# Patient Record
Sex: FEMALE | Race: WHITE | NOT HISPANIC OR LATINO | Employment: OTHER | URBAN - METROPOLITAN AREA
[De-identification: names, ages, dates, MRNs, and addresses within clinical notes are randomized per-mention and may not be internally consistent; named-entity substitution may affect disease eponyms.]

---

## 2018-02-10 ENCOUNTER — HOSPITAL ENCOUNTER (OUTPATIENT)
Facility: CLINIC | Age: 78
Setting detail: OBSERVATION
Discharge: HOME | End: 2018-02-12
Attending: EMERGENCY MEDICINE | Admitting: INTERNAL MEDICINE
Payer: COMMERCIAL

## 2018-02-10 DIAGNOSIS — R09.02 HYPOXIA: ICD-10-CM

## 2018-02-10 DIAGNOSIS — R00.0 TACHYCARDIA: ICD-10-CM

## 2018-02-10 DIAGNOSIS — J10.1 INFLUENZA A: ICD-10-CM

## 2018-02-10 LAB
ANION GAP SERPL CALCULATED.3IONS-SCNC: 8 MMOL/L (ref 3–14)
BASOPHILS # BLD AUTO: 0 10E9/L (ref 0–0.2)
BASOPHILS NFR BLD AUTO: 0.5 %
BUN SERPL-MCNC: 13 MG/DL (ref 7–30)
CALCIUM SERPL-MCNC: 9 MG/DL (ref 8.5–10.1)
CHLORIDE SERPL-SCNC: 93 MMOL/L (ref 94–109)
CO2 SERPL-SCNC: 28 MMOL/L (ref 20–32)
CREAT SERPL-MCNC: 0.66 MG/DL (ref 0.52–1.04)
DIFFERENTIAL METHOD BLD: ABNORMAL
EOSINOPHIL # BLD AUTO: 0 10E9/L (ref 0–0.7)
EOSINOPHIL NFR BLD AUTO: 0 %
ERYTHROCYTE [DISTWIDTH] IN BLOOD BY AUTOMATED COUNT: 12.7 % (ref 10–15)
FLUAV+FLUBV AG SPEC QL: NEGATIVE
FLUAV+FLUBV AG SPEC QL: POSITIVE
GFR SERPL CREATININE-BSD FRML MDRD: 87 ML/MIN/1.7M2
GLUCOSE SERPL-MCNC: 109 MG/DL (ref 70–99)
HCT VFR BLD AUTO: 39.2 % (ref 35–47)
HGB BLD-MCNC: 13.4 G/DL (ref 11.7–15.7)
IMM GRANULOCYTES # BLD: 0 10E9/L (ref 0–0.4)
IMM GRANULOCYTES NFR BLD: 0.4 %
LYMPHOCYTES # BLD AUTO: 0.4 10E9/L (ref 0.8–5.3)
LYMPHOCYTES NFR BLD AUTO: 4.2 %
MCH RBC QN AUTO: 31 PG (ref 26.5–33)
MCHC RBC AUTO-ENTMCNC: 34.2 G/DL (ref 31.5–36.5)
MCV RBC AUTO: 91 FL (ref 78–100)
MONOCYTES # BLD AUTO: 1.2 10E9/L (ref 0–1.3)
MONOCYTES NFR BLD AUTO: 13.9 %
NEUTROPHILS # BLD AUTO: 6.8 10E9/L (ref 1.6–8.3)
NEUTROPHILS NFR BLD AUTO: 81 %
NRBC # BLD AUTO: 0 10*3/UL
NRBC BLD AUTO-RTO: 0 /100
PLATELET # BLD AUTO: 189 10E9/L (ref 150–450)
POTASSIUM SERPL-SCNC: 3.6 MMOL/L (ref 3.4–5.3)
RBC # BLD AUTO: 4.32 10E12/L (ref 3.8–5.2)
SODIUM SERPL-SCNC: 129 MMOL/L (ref 133–144)
SPECIMEN SOURCE: ABNORMAL
WBC # BLD AUTO: 8.4 10E9/L (ref 4–11)

## 2018-02-10 PROCEDURE — 99285 EMERGENCY DEPT VISIT HI MDM: CPT | Mod: 25

## 2018-02-10 PROCEDURE — 40000275 ZZH STATISTIC RCP TIME EA 10 MIN

## 2018-02-10 PROCEDURE — 25000128 H RX IP 250 OP 636: Performed by: EMERGENCY MEDICINE

## 2018-02-10 PROCEDURE — 80048 BASIC METABOLIC PNL TOTAL CA: CPT | Performed by: EMERGENCY MEDICINE

## 2018-02-10 PROCEDURE — 94640 AIRWAY INHALATION TREATMENT: CPT

## 2018-02-10 PROCEDURE — 96375 TX/PRO/DX INJ NEW DRUG ADDON: CPT

## 2018-02-10 PROCEDURE — 96361 HYDRATE IV INFUSION ADD-ON: CPT

## 2018-02-10 PROCEDURE — 96374 THER/PROPH/DIAG INJ IV PUSH: CPT

## 2018-02-10 PROCEDURE — 25000125 ZZHC RX 250: Performed by: EMERGENCY MEDICINE

## 2018-02-10 PROCEDURE — 87804 INFLUENZA ASSAY W/OPTIC: CPT | Performed by: EMERGENCY MEDICINE

## 2018-02-10 PROCEDURE — 25000132 ZZH RX MED GY IP 250 OP 250 PS 637: Performed by: EMERGENCY MEDICINE

## 2018-02-10 PROCEDURE — 85025 COMPLETE CBC W/AUTO DIFF WBC: CPT | Performed by: EMERGENCY MEDICINE

## 2018-02-10 PROCEDURE — A9270 NON-COVERED ITEM OR SERVICE: HCPCS | Mod: GY | Performed by: EMERGENCY MEDICINE

## 2018-02-10 RX ORDER — ONDANSETRON 2 MG/ML
4 INJECTION INTRAMUSCULAR; INTRAVENOUS ONCE
Status: COMPLETED | OUTPATIENT
Start: 2018-02-10 | End: 2018-02-10

## 2018-02-10 RX ORDER — IPRATROPIUM BROMIDE AND ALBUTEROL SULFATE 2.5; .5 MG/3ML; MG/3ML
3 SOLUTION RESPIRATORY (INHALATION) ONCE
Status: COMPLETED | OUTPATIENT
Start: 2018-02-10 | End: 2018-02-10

## 2018-02-10 RX ORDER — KETOROLAC TROMETHAMINE 15 MG/ML
15 INJECTION, SOLUTION INTRAMUSCULAR; INTRAVENOUS ONCE
Status: COMPLETED | OUTPATIENT
Start: 2018-02-10 | End: 2018-02-10

## 2018-02-10 RX ORDER — ACETAMINOPHEN 500 MG
1000 TABLET ORAL ONCE
Status: COMPLETED | OUTPATIENT
Start: 2018-02-10 | End: 2018-02-10

## 2018-02-10 RX ORDER — OSELTAMIVIR PHOSPHATE 75 MG/1
75 CAPSULE ORAL ONCE
Status: COMPLETED | OUTPATIENT
Start: 2018-02-10 | End: 2018-02-10

## 2018-02-10 RX ADMIN — ACETAMINOPHEN 1000 MG: 500 TABLET, FILM COATED ORAL at 21:10

## 2018-02-10 RX ADMIN — OSELTAMIVIR PHOSPHATE 75 MG: 75 CAPSULE ORAL at 22:14

## 2018-02-10 RX ADMIN — SODIUM CHLORIDE 1000 ML: 9 INJECTION, SOLUTION INTRAVENOUS at 23:17

## 2018-02-10 RX ADMIN — IPRATROPIUM BROMIDE AND ALBUTEROL SULFATE 3 ML: .5; 3 SOLUTION RESPIRATORY (INHALATION) at 20:51

## 2018-02-10 RX ADMIN — ONDANSETRON 4 MG: 2 INJECTION INTRAMUSCULAR; INTRAVENOUS at 21:46

## 2018-02-10 RX ADMIN — SODIUM CHLORIDE 1000 ML: 9 INJECTION, SOLUTION INTRAVENOUS at 21:46

## 2018-02-10 RX ADMIN — KETOROLAC TROMETHAMINE 15 MG: 15 INJECTION, SOLUTION INTRAMUSCULAR; INTRAVENOUS at 21:46

## 2018-02-10 ASSESSMENT — ENCOUNTER SYMPTOMS
RHINORRHEA: 1
DIARRHEA: 1
DYSURIA: 0
FEVER: 1
CHILLS: 1
COUGH: 1
NAUSEA: 1
MYALGIAS: 1
VOMITING: 1

## 2018-02-10 NOTE — IP AVS SNAPSHOT
[facility] Observation Department    [address]    [facility] [address]    Phone:  [phone]                                       After Visit Summary   2/10/2018    Sharron Field    MRN: [id number]           After Visit Summary Signature Page     I have received my discharge instructions, and my questions have been answered. I have discussed any challenges I see with this plan with the nurse or doctor.    ..........................................................................................................................................  Patient/Patient Representative Signature      ..........................................................................................................................................  Patient Representative Print Name and Relationship to Patient    ..................................................               ................................................  Date                                            Time    ..........................................................................................................................................  Reviewed by Signature/Title    ...................................................              ..............................................  Date                                                            Time

## 2018-02-10 NOTE — IP AVS SNAPSHOT
MRN:[id number]                      After Visit Summary   2/10/2018    Sharron Field    MRN: [id number]           Thank you!     Thank you for choosing [facility] for your care. Our goal is always to provide you with excellent care. Hearing back from our patients is one way we can continue to improve our services. Please take a few minutes to complete the written survey that you may receive in the mail after you visit. If you would like to speak to someone directly about your visit please contact Patient Relations at [phone]. Thank you!          Patient Information     Date Of Birth          1940        Designated Caregiver       Most Recent Value    Caregiver    Will someone help with your care after discharge? yes    Name of designated caregiver Arnulfo-son    Phone number of caregiver [phone]    Caregiver address [place]      About your hospital stay     You were admitted on:  February 11, 2018 You last received care in the:  [facility] Observation Department    You were discharged on:  February 12, 2018        Reason for your hospital stay       Influenza                  Who to Call     For medical emergencies, please call 911.  For non-urgent questions about your medical care, please call your primary care provider or clinic, [phone]          Attending Provider     Provider Specialty    Sofia Preciado MD Emergency Medicine    Richard Walters MD Internal Medicine       Primary Care Provider Office Phone # Fax #    Jay Link [phone]      After Care Instructions     Activity       Your activity upon discharge: activity as tolerated            Diet       Follow this diet upon discharge: Orders Placed This Encounter      Regular Diet Adult                  Follow-up Appointments     Follow-up and recommended labs and tests        Follow up with primary clinic in 1 week.                             Pending  "Results     No orders found from 2018 to 2018.            Statement of Approval     Ordered          18 0859  I have reviewed and agree with all the recommendations and orders detailed in this document.  EFFECTIVE NOW     Approved and electronically signed by:  Jose Marrero MD             Admission Information     Date & Time Department Dept. Phone    2/10/2018 [facility] Observation Department [phone]      Your Vitals Were     Blood Pressure Pulse Temperature Respirations Height Weight    139/65 (BP Location: Right arm) 106 98.4  F (36.9  C) (Oral) 20 1.676 m (5' 6\") 75 kg (165 lb 6.4 oz)    Pulse Oximetry BMI (Body Mass Index)                94% 26.7 kg/m2          MyChart Information     [employer] lets you send messages to your doctor, view your test results, renew your prescriptions, schedule appointments and more. To sign up, go to [email or web address] . Click on \"Log in\" on the left side of the screen, which will take you to the Welcome page. Then click on \"Sign up Now\" on the right side of the page.     You will be asked to enter the access code listed below, as well as some personal information. Please follow the directions to create your username and password.     Your access code is: [email or web address]  Expires: 2018  8:59 AM     Your access code will  in 90 days. If you need help or a new code, please call your [place] clinic or [phone].        Care EveryWhere ID     This is your Care EveryWhere ID. This could be used by other organizations to access your [facility] medical records  [id number]        Equal Access to Services     [facility] DAVE : Hadii eloy Callahan, sera christy, qaelina steven. So [facility] [phone].    ATENCIÓN: Si habla español, tiene a wang disposición servicios gratuitos de asistencia lingüística. Llame al [phone].    We comply with applicable federal civil " rights laws and [place] laws. We do not discriminate on the basis of race, color, national origin, age, disability, sex, sexual orientation, or gender identity.               Review of your medicines      START taking        Dose / Directions    oseltamivir 75 MG capsule   Commonly known as:  TAMIFLU   Indication:  Flu        Dose:  75 mg   Take 1 capsule (75 mg) by mouth 2 times daily for 6 doses   Quantity:  6 capsule   Refills:  0         CONTINUE these medicines which have NOT CHANGED        Dose / Directions    losartan-hydrochlorothiazide 100-25 MG per tablet   Commonly known as:  HYZAAR        Dose:  1 tablet   Take 1 tablet by mouth daily   Refills:  0       VITAMIN D (CHOLECALCIFEROL) PO        Dose:  5000 Units   Take 5,000 Units by mouth daily   Refills:  0            Where to get your medicines      These medications were sent to [facility] [address] [address] [address]     Phone:  [phone]     oseltamivir 75 MG capsule                Protect others around you: Learn how to safely use, store and throw away your medicines at www.disposemymeds.org.        ANTIBIOTIC INSTRUCTION     You've Been Prescribed an Antibiotic - Now What?  Your healthcare team thinks that you or your loved one might have an infection. Some infections can be treated with antibiotics, which are powerful, life-saving drugs. Like all medications, antibiotics have side effects and should only be used when necessary. There are some important things you should know about your antibiotic treatment.      Your healthcare team may run tests before you start taking an antibiotic.    Your team may take samples (e.g., from your blood, urine or other areas) to run tests to look for bacteria. These test can be important to determine if you need an antibiotic at all and, if you do, which antibiotic will work best.      Within a few days, your healthcare team might  change or even stop your antibiotic.    Your team may start you on an antibiotic while they are working to find out what is making you sick.    Your team might change your antibiotic because test results show that a different antibiotic would be better to treat your infection.    In some cases, once your team has more information, they learn that you do not need an antibiotic at all. They may find out that you don't have an infection, or that the antibiotic you're taking won't work against your infection. For example, an infection caused by a virus can't be treated with antibiotics. Staying on an antibiotic when you don't need it is more likely to be harmful than helpful.      You may experience side effects from your antibiotic.    Like all medications, antibiotics have side effects. Some of these can be serious.    Let you healthcare team know if you have any known allergies when you are admitted to the hospital.    One significant side effect of nearly all antibiotics is the risk of severe and sometimes deadly diarrhea caused by Clostridium difficile (C. Difficile). This occurs when a person takes antibiotics because some good germs are destroyed. Antibiotic use allows C. diificile to take over, putting patients at high risk for this serious infection.    As a patient or caregiver, it is important to understand your or your loved one's antibiotic treatment. It is especially important for caregivers to speak up when patients can't speak for themselves. Here are some important questions to ask your healthcare team.    What infection is this antibiotic treating and how do you know I have that infection?    What side effects might occur from this antibiotic?    How long will I need to take this antibiotic?    Is it safe to take this antibiotic with other medications or supplements (e.g., vitamins) that I am taking?     Are there any special directions I need to know about taking this antibiotic? For example, should I  take it with food?    How will I be monitored to know whether my infection is responding to the antibiotic?    What tests may help to make sure the right antibiotic is prescribed for me?      Information provided by:  www.cdc.gov/getsmart  U.S. Department of Health and Human Services  Centers for disease Control and Prevention  National Center for Emerging and Zoonotic Infectious Diseases  Division of Healthcare Quality Promotion             Medication List: This is a list of all your medications and when to take them. Check marks below indicate your daily home schedule. Keep this list as a reference.      Medications           Morning Afternoon Evening Bedtime As Needed    losartan-hydrochlorothiazide 100-25 MG per tablet   Commonly known as:  HYZAAR   Take 1 tablet by mouth daily                                oseltamivir 75 MG capsule   Commonly known as:  TAMIFLU   Take 1 capsule (75 mg) by mouth 2 times daily for 6 doses   Last time this was given:  75 mg on 2/12/2018  8:30 AM                                VITAMIN D (CHOLECALCIFEROL) PO   Take 5,000 Units by mouth daily

## 2018-02-11 PROBLEM — J10.1 INFLUENZA A: Status: ACTIVE | Noted: 2018-02-11

## 2018-02-11 PROCEDURE — 25000128 H RX IP 250 OP 636: Performed by: INTERNAL MEDICINE

## 2018-02-11 PROCEDURE — 96361 HYDRATE IV INFUSION ADD-ON: CPT

## 2018-02-11 PROCEDURE — 99219 ZZC INITIAL OBSERVATION CARE,LEVL II: CPT | Performed by: INTERNAL MEDICINE

## 2018-02-11 PROCEDURE — 96376 TX/PRO/DX INJ SAME DRUG ADON: CPT

## 2018-02-11 PROCEDURE — 99207 ZZC CDG-MDM COMPONENT: MEETS LOW - DOWN CODED: CPT | Performed by: INTERNAL MEDICINE

## 2018-02-11 PROCEDURE — 25000128 H RX IP 250 OP 636: Performed by: EMERGENCY MEDICINE

## 2018-02-11 PROCEDURE — 25000132 ZZH RX MED GY IP 250 OP 250 PS 637

## 2018-02-11 PROCEDURE — 25000132 ZZH RX MED GY IP 250 OP 250 PS 637: Performed by: INTERNAL MEDICINE

## 2018-02-11 PROCEDURE — G0378 HOSPITAL OBSERVATION PER HR: HCPCS

## 2018-02-11 PROCEDURE — 99207 ZZC APP CREDIT; MD BILLING SHARED VISIT: CPT | Performed by: INTERNAL MEDICINE

## 2018-02-11 RX ORDER — LOSARTAN POTASSIUM AND HYDROCHLOROTHIAZIDE 12.5; 5 MG/1; MG/1
2 TABLET ORAL DAILY
Status: DISCONTINUED | OUTPATIENT
Start: 2018-02-11 | End: 2018-02-12 | Stop reason: HOSPADM

## 2018-02-11 RX ORDER — LOSARTAN POTASSIUM AND HYDROCHLOROTHIAZIDE 25; 100 MG/1; MG/1
1 TABLET ORAL DAILY
COMMUNITY
End: 2023-08-09 | Stop reason: ALTCHOICE

## 2018-02-11 RX ORDER — ACETAMINOPHEN 650 MG/1
650 SUPPOSITORY RECTAL EVERY 4 HOURS PRN
Status: DISCONTINUED | OUTPATIENT
Start: 2018-02-11 | End: 2018-02-12 | Stop reason: HOSPADM

## 2018-02-11 RX ORDER — NALOXONE HYDROCHLORIDE 0.4 MG/ML
.1-.4 INJECTION, SOLUTION INTRAMUSCULAR; INTRAVENOUS; SUBCUTANEOUS
Status: DISCONTINUED | OUTPATIENT
Start: 2018-02-11 | End: 2018-02-12 | Stop reason: HOSPADM

## 2018-02-11 RX ORDER — ACETAMINOPHEN 325 MG/1
TABLET ORAL
Status: COMPLETED
Start: 2018-02-11 | End: 2018-02-11

## 2018-02-11 RX ORDER — ACETAMINOPHEN 325 MG/1
650 TABLET ORAL EVERY 4 HOURS PRN
Status: DISCONTINUED | OUTPATIENT
Start: 2018-02-11 | End: 2018-02-12 | Stop reason: HOSPADM

## 2018-02-11 RX ORDER — OSELTAMIVIR PHOSPHATE 75 MG/1
75 CAPSULE ORAL 2 TIMES DAILY
Status: DISCONTINUED | OUTPATIENT
Start: 2018-02-11 | End: 2018-02-12 | Stop reason: HOSPADM

## 2018-02-11 RX ORDER — ONDANSETRON 4 MG/1
4 TABLET, ORALLY DISINTEGRATING ORAL EVERY 6 HOURS PRN
Status: DISCONTINUED | OUTPATIENT
Start: 2018-02-11 | End: 2018-02-12 | Stop reason: HOSPADM

## 2018-02-11 RX ORDER — ONDANSETRON 2 MG/ML
4 INJECTION INTRAMUSCULAR; INTRAVENOUS EVERY 6 HOURS PRN
Status: DISCONTINUED | OUTPATIENT
Start: 2018-02-11 | End: 2018-02-12 | Stop reason: HOSPADM

## 2018-02-11 RX ORDER — SODIUM CHLORIDE AND POTASSIUM CHLORIDE 150; 900 MG/100ML; MG/100ML
INJECTION, SOLUTION INTRAVENOUS CONTINUOUS
Status: DISCONTINUED | OUTPATIENT
Start: 2018-02-11 | End: 2018-02-11

## 2018-02-11 RX ADMIN — SODIUM CHLORIDE 1000 ML: 9 INJECTION, SOLUTION INTRAVENOUS at 00:22

## 2018-02-11 RX ADMIN — OSELTAMIVIR PHOSPHATE 75 MG: 75 CAPSULE ORAL at 08:26

## 2018-02-11 RX ADMIN — ACETAMINOPHEN 650 MG: 325 TABLET, FILM COATED ORAL at 08:26

## 2018-02-11 RX ADMIN — POTASSIUM CHLORIDE: 2 INJECTION, SOLUTION, CONCENTRATE INTRAVENOUS at 11:48

## 2018-02-11 RX ADMIN — ACETAMINOPHEN 650 MG: 325 TABLET, FILM COATED ORAL at 23:54

## 2018-02-11 RX ADMIN — ACETAMINOPHEN 650 MG: 325 TABLET, FILM COATED ORAL at 15:32

## 2018-02-11 RX ADMIN — ACETAMINOPHEN 650 MG: 325 TABLET, FILM COATED ORAL at 01:25

## 2018-02-11 RX ADMIN — POTASSIUM CHLORIDE: 2 INJECTION, SOLUTION, CONCENTRATE INTRAVENOUS at 21:17

## 2018-02-11 RX ADMIN — POTASSIUM CHLORIDE: 2 INJECTION, SOLUTION, CONCENTRATE INTRAVENOUS at 01:27

## 2018-02-11 RX ADMIN — OSELTAMIVIR PHOSPHATE 75 MG: 75 CAPSULE ORAL at 20:41

## 2018-02-11 ASSESSMENT — PAIN DESCRIPTION - DESCRIPTORS
DESCRIPTORS: ACHING
DESCRIPTORS: ACHING

## 2018-02-11 NOTE — H&P
Admitted:     02/10/2018      DATE OF ADMISSION:  02/11/2018       CHIEF COMPLAINT:  Fever, weakness, nausea, emesis, runny nose.      HISTORY OF PRESENT ILLNESS:  This is a 77-year-old white female with a history of hypertension who presents with cough which is dry, runny nose, myalgias, headache as well as a fever.  She also had 1 episode of loose stools and has had 2 episodes of emesis which were nonbloody.  She has been in contact with her daughter who has been sick for the past week with a viral syndrome which got better with DayQuil and Nyquil.  The patient feels quite fatigued and weak and hence she came into the ER for further evaluation.  In the ER over here, the patient was seen by Dr. Sofia Preciado.  She was noted to have positive influenza.  I discussed the case with Dr. Preciado and she is going to be admitted for further evaluation.      ALLERGIES:  SULFA.        MEDICATIONS:  Losartan.      PAST MEDICAL HISTORY:  Hypertension.      PAST SURGICAL HISTORY:  Significant for tonsillectomy and adenoidectomy, partial hysterectomy, dental surgery, nasal surgery.      FAMILY HISTORY:  Significant for leukemia in her sister.      SOCIAL HISTORY:  The patient does not smoke.  She drinks alcohol on occasion.      OUTPATIENT MEDICATIONS:  Losartan and HCTZ.      REVIEW OF SYSTEMS:  As mentioned in the HPI, she denies any chest pain or shortness of breath.  She does endorse some lightheadedness.   She denies any abdominal pain.   All other systems are extensively reviewed and deemed unremarkable and negative.      PHYSICAL EXAMINATION:   VITAL SIGNS:  Temperature is 99.5, pulse is 118.  Blood pressure is 127/56, respiratory rate 18, O2 sat is 95% on room air.   GENERAL:  She is alert, awake, oriented, coherent, nontoxic, in no acute distress.   HEENT:  Pupils equal, round, reactive to light.  Pharynx: There is no exudate noted.   LUNGS:  Clear to auscultation.   HEART:  Tachycardic, S1, S2 normal.  No murmurs or  gallops.   ABDOMEN:  Soft, nontender, nondistended, with good bowel sounds.   EXTREMITIES:  There is no edema.   NEUROLOGIC:  Cranial nerves II-XII are grossly within normal limits.  Motor:  She moves all extremities.      LABORATORY:  Labwork obtained here included a basic metabolic panel which is grossly unremarkable other than a sodium of 129 and a chloride of 93.  A CBC with diff is grossly unremarkable.  Her influenza screen is positive for influenza A.      ASSESSMENT AND PLAN:     1.  Positive for influenza A. We will admit her under observation status.  We will hydrate her with IV fluids.  She has been initiated on Tamiflu, which I will continue.   2.  Essential hypertension.  We will hold off on her losartan and HCTZ for now.      CODE STATUS:  Full code.      She will be admitted under observation status.         NICOLASA BRUSH MD             D: 2018   T: 2018   MT: MD      Name:     MIKE WEIR   MRN:      [id number]        Account:      [id number]   :      1940        Admitted:     02/10/2018                   Document: [id number]

## 2018-02-11 NOTE — ED NOTES
[facility]  ED Nurse Handoff Report    Sharron Field is a 77 year old female   ED Chief complaint: Influenza  . ED Diagnosis:   Final diagnoses:   Influenza A   Hypoxia   Tachycardia     Allergies:   Allergies   Allergen Reactions     Sulfa Drugs        Code Status: Full Code  Activity level - Baseline/Home:  Independent. Activity Level - Current:   Stand with Assist. Lift room needed: No. Bariatric: No   Needed: No   Isolation: No. Infection: Not Applicable.     Vital Signs:   Vitals:    02/10/18 2300 02/10/18 2315 02/10/18 2316 02/10/18 2330   BP: 114/46 115/52  127/56   Pulse:       Resp:       Temp:       TempSrc:       SpO2:  95% 97% 95%   Weight:           Cardiac Rhythm:  ,      Pain level: 0-10 Pain Scale: 5  Patient confused: No. Patient Falls Risk: Yes.   Elimination Status: Has voided   Patient Report - Initial Complaint: Flu symptoms.. Focused Assessment: Respiratory - Respiratory WDL:  WDL except; all Rhythm/Pattern, Respiratory: pattern irregular; shortness of breath reported Lung Fields: All Fields Cough Frequency: frequent Cough Type: good; productive (Pt reports having cough and congestion.)   Abnormal Results:   Labs Ordered and Resulted from Time of ED Arrival Up to the Time of Departure from the ED   CBC WITH PLATELETS DIFFERENTIAL - Abnormal; Notable for the following:        Result Value    Absolute Lymphocytes 0.4 (*)     All other components within normal limits   BASIC METABOLIC PANEL - Abnormal; Notable for the following:     Sodium 129 (*)     Chloride 93 (*)     Glucose 109 (*)     All other components within normal limits   INFLUENZA A/B ANTIGEN - Abnormal; Notable for the following:     Influenza A Positive (*)     All other components within normal limits   PERIPHERAL IV CATHETER   PULSE OXIMETRY NURSING     .   Treatments provided: fluids, meds, neb  Family Comments: daughter at bedside.  OBS brochure/video discussed/provided to patient:  Yes  ED Medications:    Medications   0.9% sodium chloride BOLUS (1,000 mLs Intravenous New Bag 2/11/18 0022)   ipratropium - albuterol 0.5 mg/2.5 mg/3 mL (DUONEB) neb solution 3 mL (3 mLs Nebulization Given 2/10/18 2051)   0.9% sodium chloride BOLUS (0 mLs Intravenous Stopped 2/11/18 0019)   ondansetron (ZOFRAN) injection 4 mg (4 mg Intravenous Given 2/10/18 2146)   ketorolac (TORADOL) injection 15 mg (15 mg Intravenous Given 2/10/18 2146)   acetaminophen (TYLENOL) tablet 1,000 mg (1,000 mg Oral Given 2/10/18 2110)   oseltamivir (TAMIFLU) capsule 75 mg (75 mg Oral Given 2/10/18 2214)   0.9% sodium chloride BOLUS (0 mLs Intravenous Stopped 2/11/18 0019)     Drips infusing:  No  For the majority of the shift, the patient's behavior Green. Interventions performed were n/a.     Severe Sepsis OR Septic Shock Diagnosis Present: No      ED Nurse Name/Phone Number: Ming Ayon,   12:39 AM    RECEIVING UNIT ED HANDOFF REVIEW    Above ED Nurse Handoff Report was reviewed: Yes  Reviewed by: Hailey Madera on February 11, 2018 at 12:53 AM

## 2018-02-11 NOTE — PLAN OF CARE
Problem: Patient Care Overview  Goal: Plan of Care/Patient Progress Review  PRIMARY DIAGNOSIS: Influenza A  OUTPATIENT/OBSERVATION GOALS TO BE MET BEFORE DISCHARGE:  1. Dyspnea improved and O2 sats >88% on RA or back to baseline O2 levels: Yes   SpO2: 96 %, O2 Device: None (Room air)    2. Tolerating oral abx or appropriate plans made outpatient infusion: Yes, on Tamiflu    3. Vitals signs normal or return to baseline: Yes    4. Short term supplemental O2 needed with activity at home: No    5. Tolerate oral intake to maintain hydration: tolerating PO.  On IV fluids to improved hydration.    6. Return to near baseline physical activity: Yes    Discharge Planner Nurse   Safe discharge environment identified: Yes  Barriers to discharge: Yes       Entered by: Kunal Tanner 02/11/2018 3:44 PM        VSS.  Pt given tylenol for shoulder aches 2/10.   Denies SOB, nausea.   IVF infusing.  Ongoing assessment.  Please review provider order for any additional goals.   Nurse to notify provider when observation goals have been met and patient is ready for discharge.

## 2018-02-11 NOTE — PLAN OF CARE
Problem: Patient Care Overview  Goal: Plan of Care/Patient Progress Review  Outcome: Improving  PRIMARY DIAGNOSIS: Influenza A  OUTPATIENT/OBSERVATION GOALS TO BE MET BEFORE DISCHARGE:  1. Dyspnea improved and O2 sats >88% on RA or back to baseline O2 levels: Yes   SpO2: 98 %, O2 Device: None (Room air)    2. Tolerating oral abx or appropriate plans made outpatient infusion: Yes, on oral tamilfu    3. Vitals signs normal or return to baseline: Yes    4. Short term supplemental O2 needed with activity at home: No, will assess while ambulating.     5. Tolerate oral intake to maintain hydration: No, Per pt reporting that she has not been able to drink enough fluid. Will assess how pt tolerates breakfast     Return to near baseline physical activity: Yes, Pt SBA and denies dizziness/SOB/CP while ambulating in room. Will attempt to ambulate in halls and check O2 sats after breakfast.     BP slightly Elevated, per pt usually takes BP meds at home. C/o of mild body aches, given PRN tylenol. LS slight crackles.     Discharge Planner Nurse   Safe discharge environment identified: Yes  Barriers to discharge: No       Entered by: Debora Min 02/11/2018 9:07 AM     Please review provider order for any additional goals.   Nurse to notify provider when observation goals have been met and patient is ready for discharge.

## 2018-02-11 NOTE — PLAN OF CARE
PRIMARY DIAGNOSIS: Influenza  OUTPATIENT/OBSERVATION GOALS TO BE MET BEFORE DISCHARGE:  1. Dyspnea improved and O2 sats >88% on RA or back to baseline O2 levels: No, still requiring 2 liters of oxygen while asleep  SpO2: 95 %, O2 Device: None (Room air)     2. Tolerating oral abx or appropriate plans made outpatient infusion: Yes     3. Vitals signs normal or return to baseline: Yes     4. Short term supplemental O2 needed with activity at home: No     5. Tolerate oral intake to maintain hydration: No, IVF continues     6. Return to near baseline physical activity: Yes     Discharge Planner Nurse   Safe discharge environment identified: Yes  Barriers to discharge: No       Entered by: Hailey Madera 02/11/2018 3:33 AM  Please review provider order for any additional goals.   Nurse to notify provider when observation goals have been met and patient is ready for discharge.     Pt admitted for Influenza A. Droplet precautions initiated. Pt with a congested frequent cough, some achiness and chills. Pt with VSS, tylenol given for achiness..

## 2018-02-11 NOTE — ED NOTES
Pt was ambulated. Pt started at 94% Oxygen, but dropped down to 88% at the end of the walk. Pt's O2 level dropped to 90% while coughing. Pt's heart rate started at 112bpm, but average 122 bpm while walking. Pt was started on 2L of oxygen upon returning to room as oxygen level was 89%.

## 2018-02-11 NOTE — PROGRESS NOTES
"Pt ambulated in halls (wearing a mask) with NST. Per NST pt tolerated well, just c/o of \"being tired.\" O2 sats above 90% entire walk on RA, with exception of one dip to 90%. Will continue to assess and monitor.    "

## 2018-02-11 NOTE — PROGRESS NOTES
I saw patient and agree plan initiated by Dr Walters this morning.  He symptoms have improved and nausa seems resolved.  Still tachycardic and not urinating much.  Will continue today with IVF and tamiflu.  If improving, tolerating diet, ambulating well and tachycardia improving she could discharge tomorrow.

## 2018-02-11 NOTE — PLAN OF CARE
Problem: Patient Care Overview  Goal: Plan of Care/Patient Progress Review  Outcome: No Change  Outcome: Improving  PRIMARY DIAGNOSIS: Influenza A  OUTPATIENT/OBSERVATION GOALS TO BE MET BEFORE DISCHARGE:  1. Dyspnea improved and O2 sats >88% on RA or back to baseline O2 levels: Yes   SpO2: 98 %, O2 Device: None (Room air)     2. Tolerating oral abx or appropriate plans made outpatient infusion: Yes, on oral tamilfu     3. Vitals signs normal or return to baseline: Yes     4. Short term supplemental O2 needed with activity at home: No, not at this time      Tolerate oral intake to maintain hydration: Yes, pt tolerating water, and eating smaller meals.     Return to near baseline physical activity: Yes, Pt SBA, ambulated in halls with mask     BP slightly Elevated, per pt usually takes BP meds at home. MD paged. C/o of mild body aches, given PRN tylenol. LS slight crackles. IS given. Pt ambulated in halls and tolerated.      Discharge Planner Nurse   Safe discharge environment identified: Yes  Barriers to discharge: No       Entered by: Debora Min 02/11/2018 9:07 AM  Please review provider order for any additional goals.   Nurse to notify provider when observation goals have been met and patient is ready for discharge.

## 2018-02-11 NOTE — PHARMACY-ADMISSION MEDICATION HISTORY
Admission medication history interview status for this patient is complete. See [facility] admission navigator for allergy information, prior to admission medications and immunization status.     Medication history interview source(s):Patient  Medication history resources (including written lists, pill bottles, clinic record): Care Everywhere  Primary pharmacy:[facility] on [address] in [place]    Changes made to PTA medication list:  Added: none  Deleted: none  Changed: Losartan to Losartan/HCTZ, Vit D - added dose instructions.    Actions taken by pharmacist (provider contacted, etc):None     Additional medication history information:None    Medication reconciliation/reorder completed by provider prior to medication history? No    Do you take OTC medications (eg tylenol, ibuprofen, fish oil, eye/ear drops, etc)? Yes (Y/N)    For patients on insulin therapy: No (Y/N)    Prior to Admission medications    Medication Sig Last Dose Taking? Auth Provider   losartan-hydrochlorothiazide (HYZAAR) 100-25 MG per tablet Take 1 tablet by mouth daily 2/10/2018 at Unknown time Yes Unknown, Entered By History   VITAMIN D, CHOLECALCIFEROL, PO Take 5,000 Units by mouth daily  2/9/2018 Yes Reported, Patient

## 2018-02-11 NOTE — PLAN OF CARE
PRIMARY DIAGNOSIS: Influenza  OUTPATIENT/OBSERVATION GOALS TO BE MET BEFORE DISCHARGE:  1. Dyspnea improved and O2 sats >88% on RA or back to baseline O2 levels: No, still requiring 2 liters of oxygen while asleep  SpO2: 95 %, O2 Device: None (Room air)    2. Tolerating oral abx or appropriate plans made outpatient infusion: Yes    3. Vitals signs normal or return to baseline: Yes    4. Short term supplemental O2 needed with activity at home: No    5. Tolerate oral intake to maintain hydration: No, IVF continues    6. Return to near baseline physical activity: Yes    Discharge Planner Nurse   Safe discharge environment identified: Yes  Barriers to discharge: No       Entered by: Hailey Madera 02/11/2018 3:33 AM     Please review provider order for any additional goals.   Nurse to notify provider when observation goals have been met and patient is ready for discharge.    Pt admitted for Influenza A. Droplet precautions initiated. Pt with a congested frequent cough, some achiness and chills. Pt with VSS, tylenol given for achiness..    ROOM # 223    Living Situation (if not independent, order SW consult):  Facility name:  : [facility]    Activity level at baseline: ind  Activity level on admit: sba      Patient registered to observation; given Patient Bill of Rights; given the opportunity to ask questions about observation status and their plan of care.  Patient has been oriented to the observation room, bathroom and call light is in place.    Discussed discharge goals and expectations with patient/family.

## 2018-02-11 NOTE — ED PROVIDER NOTES
"  History     Chief Complaint:  Flu-Like Symptoms    The history is provided by the patient.      Sharron Field is an otherwise healthy 77 year old female who presents with flu-like symptoms. The patient reports she has a cough, myalgias, chills, fever of up to 101F, and rhinorrhea. She also states she has vomited a few times and had one episode of diarrhea today. She reports she is weak and can \"barely get up off the couch.\" She notes her daughter has been sick with a cold and her co-workers have also been sick with flu-like illnesses. She states she has not taken any medications for her symptoms. She denies dysuria.    Allergies:  Sulfa Drugs     Medications:    Losartan Potassium    Past Medical History:    HTN    Past Surgical History:    History reviewed. No pertinent surgical history.     Family History:    History reviewed. No pertinent family history.      Social History:  Smoking status: No  Alcohol use: No   Marital Status:  Single   Presents alone  Works as an     Review of Systems   Constitutional: Positive for chills and fever.   HENT: Positive for rhinorrhea.    Respiratory: Positive for cough.    Gastrointestinal: Positive for diarrhea, nausea and vomiting.   Genitourinary: Negative for dysuria.   Musculoskeletal: Positive for myalgias.   All other systems reviewed and are negative.    Physical Exam     Patient Vitals for the past 24 hrs:   BP Temp Temp src Pulse Heart Rate Resp SpO2 Weight   02/10/18 2330 127/56 - - - - - 95 % -   02/10/18 2316 - - - - - - 97 % -   02/10/18 2315 115/52 - - - - - 95 % -   02/10/18 2300 114/46 - - - - - - -   02/10/18 2245 105/47 - - - - - 94 % -   02/10/18 2237 - - - - - - 94 % -   02/10/18 2236 - - - - - - 93 % -   02/10/18 2235 - - - - - - 95 % -   02/10/18 2234 - - - - - - 96 % -   02/10/18 2233 - - - - - - 93 % -   02/10/18 2232 - - - - - - 94 % -   02/10/18 2231 - - - - - - 93 % -   02/10/18 2230 106/46 - - - - - 90 % -   02/10/18 2215 - - - - " - - 95 % -   02/10/18 2145 - - - - - - 94 % -   02/10/18 2130 - - - - - - 92 % -   02/10/18 2115 - - - - - - 97 % -   02/10/18 2100 - - - - - - 98 % -   02/10/18 2054 - - - - - - 95 % -   02/10/18 2045 - - - - - - 96 % -   02/10/18 2030 - - - - - - 95 % -   02/10/18 2015 - - - - - - 96 % -   02/10/18 2004 123/61 99.5  F (37.5  C) Oral 118 118 18 95 % 70.3 kg (155 lb)        Physical Exam  General: Well-developed and well-nourished; fatigued appearing elderly  woman; cooperative  Head:  Atraumatic  Eyes:  Conjunctivae, lids, and sclerae are normal  ENT:    Normal oropharynx; Normal nose; moist mucous membranes  Neck:  Supple; normal range of motion  CV:  Tachycardic rate and regular rhythm; normal heart sounds with no murmurs, rubs, or gallops detected  Resp:  No respiratory distress; clear to auscultation bilaterally without decreased breath sounds, wheezing, rales, or rhonchi  GI:  Soft; non-distended; non-tender    MS:  Normal ROM; no bilateral lower extremity edema  Skin:  Warm; non-diaphoretic; no pallor  Neuro: Awake; A&Ox3; normal strength  Psych:  Normal mood and affect; normal speech  Vitals reviewed.    Emergency Department Course   Laboratory:  CBC: WNL (WBC 8.4, HGB 13.4, )   BMP:  (L), Chloride 93 (L), Glucose 109 (H), o/w WNL (Creatinine 0.66)  Influenza A/B antigen: Influenza A positive    Interventions:  2051: DuoNeb, 2.5mg/3 mL, Inhalation solution, Nebulizer   2110: Tylenol 75 mg PO  2146: NS 1L IV Bolus  2146: Zofran 4 mg IV  2146: Toradol 15 mg IV  2214: Tamiflu 75 mg PO  2317: NS 1L IV Bolus   0022: NS 1L IV Bolus     Emergency Department Course:  Past medical records, nursing notes, and vitals reviewed.  2039: I performed an exam of the patient and obtained history, as documented above.   IV inserted and blood drawn.    2357: I rechecked the patient. Explained findings to the patient. She accepts offer for observation admission. She remains tachycardic to 118 and her oxygen  was 92% on room air.    Patient ambulated with hypoxia to 87%.    Findings and plan explained to the patient who consents to admission.     0020: Discussed the patient with Dr. Walters, who will admit the patient to an observation bed for further monitoring, evaluation, and treatment.      Impression & Plan    Medical Decision Making:  Sharron is a 77-year-old female who presents with 1 day of generalized myalgias, fever to 101 F, rhinorrhea, cough, and other influenza-like illness symptoms.  On exam patient appears fatigued and is tachycardic though she has clear lungs and is nontoxic-appearing.    Patient was given IV fluids, Tylenol, Toradol, and Zofran.  I gave her a DuoNeb for her cough though she states it did not significantly improve her symptoms.  CBC is unremarkable.  BMP reveals evidence of a mild degree of dehydration with a hypochloremia to 93 and hyponatremia to 129.  Most notably patient's influenza A is positive.  Patient was given her initial dose of Tamiflu and a repeat bolus of IV fluids given she remained tachycardic after the first.  However, after these 2 L patient was reevaluated and remained tachycardic to 118 bpm.  She is also borderline hypoxic at 92% on room air while sitting in bed.  She ambulated in the department and had hypoxia to 87%.  Thus, patient was offered an observation stay and is amenable.  A third L of IV fluids was initiated and patient was placed on 2 L nasal cannula oxygen.  I discussed the results with her and answered all her questions.  She verbalized understanding is amenable to plan.  I do not believe patient warrants imaging at this time given she has only 1 day of symptoms and a good explanation for them with her positive influenza.  Further laboratory studies are unlikely to .  I discussed patient's case with Dr. Walters, hospitalist, who accepts the patient and has no further orders.    Diagnosis:    ICD-10-CM   1. Influenza A J10.1   2. Hypoxia R09.02    3. Tachycardia R00.0     Disposition:  Admitted for observation    Asuncion Cardenas  2/10/2018   [facility] EMERGENCY DEPARTMENT    I, Asuncion Cardenas, am serving as a scribe at 8:39 PM on 2/10/2018 to document services personally performed by Sofia Preciado MD based on my observations and the provider's statements to me.       Sofia Preciado MD  02/11/18 0307

## 2018-02-12 VITALS
SYSTOLIC BLOOD PRESSURE: 139 MMHG | BODY MASS INDEX: 26.58 KG/M2 | HEART RATE: 106 BPM | OXYGEN SATURATION: 94 % | DIASTOLIC BLOOD PRESSURE: 65 MMHG | TEMPERATURE: 98.4 F | WEIGHT: 165.4 LBS | RESPIRATION RATE: 20 BRPM | HEIGHT: 66 IN

## 2018-02-12 PROCEDURE — G0378 HOSPITAL OBSERVATION PER HR: HCPCS

## 2018-02-12 PROCEDURE — 25000132 ZZH RX MED GY IP 250 OP 250 PS 637: Performed by: INTERNAL MEDICINE

## 2018-02-12 PROCEDURE — 99207 ZZC NON-BILLABLE SERV PER CHARTING: CPT | Performed by: INTERNAL MEDICINE

## 2018-02-12 RX ORDER — OSELTAMIVIR PHOSPHATE 75 MG/1
75 CAPSULE ORAL 2 TIMES DAILY
Qty: 6 CAPSULE | Refills: 0 | Status: SHIPPED | OUTPATIENT
Start: 2018-02-12 | End: 2018-02-15

## 2018-02-12 RX ADMIN — OSELTAMIVIR PHOSPHATE 75 MG: 75 CAPSULE ORAL at 08:30

## 2018-02-12 RX ADMIN — LOSARTAN POTASSIUM AND HYDROCHLOROTHIAZIDE 1 TABLET: 50; 12.5 TABLET, FILM COATED ORAL at 08:30

## 2018-02-12 NOTE — PLAN OF CARE
Problem: Infection, Risk/Actual (Adult)  Goal: Identify Related Risk Factors and Signs and Symptoms  Related risk factors and signs and symptoms are identified upon initiation of Human Response Clinical Practice Guideline (CPG).   Outcome: Adequate for Discharge Date Met: 02/12/18  Patient's After Visit Summary was reviewed with patient.  Patient verbalized understanding of After Visit Summary, recommended follow up and was given an opportunity to ask questions.   Discharge medications sent home with patient/family: YES,    Discharged with other:Friend      OBSERVATION patient END time: 1008    Patient is stable at discharge and was provided a wheelchair ride out to car.

## 2018-02-12 NOTE — PROGRESS NOTES
Infection Prevention:    Patient requires Droplet precautions because of Influenza. Please contact Infection Prevention with any questions/concerns at *[phone].    Charmaine Escamilla, ICP

## 2018-02-12 NOTE — PLAN OF CARE
Problem: Patient Care Overview  Goal: Plan of Care/Patient Progress Review  Outcome: No Change  PRIMARY DIAGNOSIS: Influenza A    OUTPATIENT/OBSERVATION GOALS TO BE MET BEFORE DISCHARGE  1. Orthostatic performed: No    2. Tolerating PO medications: Yes    3. Return to near baseline physical activity: No    4. Cleared for discharge by consultants (if involved): N/A    Discharge Planner Nurse   Safe discharge environment identified: Yes  Barriers to discharge: Yes       Entered by: Georgette Siddiqui 02/12/2018 3:40 AM      A&Ox4. Vitals stable. Denies chest pain. Complains of SOB when ambulating. Frequent non-productive cough. Lungs sounds diminished. Has been using the incentive spirometer. Complains of body aches and weakness but says it is improving. Pt states that she had 1 liquid stool since she has been here. Complains of a headache, tylenol given. Tamiflu BID. NS with 20K at 100 infusing. Tolerating regular diet. Ambulates with SBA. Will continue to monitor and provide comfort care.    Please review provider order for any additional goals.   Nurse to notify provider when observation goals have been met and patient is ready for discharge.

## 2018-02-12 NOTE — PLAN OF CARE
Problem: Patient Care Overview  Goal: Plan of Care/Patient Progress Review  Outcome: Improving  PRIMARY DIAGNOSIS: Influenza A  OUTPATIENT/OBSERVATION GOALS TO BE MET BEFORE DISCHARGE:  1. Dyspnea improved and O2 sats >88% on RA or back to baseline O2 levels: Yes   SpO2: 95 %, O2 Device: None (Room air)    2. Tolerating oral abx or appropriate plans made outpatient infusion: Yes    3. Vitals signs normal or return to baseline: Yes    4. Short term supplemental O2 needed with activity at home: No    5. Tolerate oral intake to maintain hydration: PO intake marginal, IVF running    6. Return to near baseline physical activity: Yes    Discharge Planner Nurse   Safe discharge environment identified: Yes  Barriers to discharge: Yes       Entered by: Kunal Tanner 02/11/2018 9:13 PM        VSS.  Denies pain.  Nonproductive cough.  Pt doing IS independently.  IVF infusing.   UP to bathroom with minimal assist.  Ongoing assessment.  Please review provider order for any additional goals.   Nurse to notify provider when observation goals have been met and patient is ready for discharge.

## 2018-02-12 NOTE — PLAN OF CARE
Problem: Patient Care Overview  Goal: Plan of Care/Patient Progress Review  Outcome: No Change  O2 sats 90% after ambulating to the bathroom. Pt complained of SOB, 2L NC applied. All other viitals stable. Denies chest pain. Frequent non-productive cough. Lungs sounds diminished. Has been using the incentive spirometer. Complains of body aches and weakness but says it is improving. Pt states that she had 1 liquid stool since she has been here. Complained of a headache earlier in shift, tylenol given. Tamiflu BID. NS with 20K at 100 infusing. Tolerating regular diet. Ambulates with SBA. Will continue to monitor and provide comfort care.

## 2018-02-16 NOTE — DISCHARGE SUMMARY
Admit Date:     02/10/2018   Discharge Date:     2018      DATE OF ADMISSION: 2018   DATE OF DISCHARGE: 2018      DISPOSITION: Discharged home.      DIAGNOSES:   1.  Influenza A.   2.  Nausea.   3.  Tachycardia and likely dehydration.   4.  Hypertension.      HOSPITAL COURSE: Influenza: Ms. Field is a 77-year-old woman who presented with fever, weakness, nausea, emesis and tachycardia.  She was found to be influenza A positive.  She was treated with IV fluids and her diet was slowly advanced.  On the day of discharge, her tachycardia had resolved and she was tolerating oral intake well.  She was treated with Tamiflu and will complete a course as directed.      DISCHARGE MEDICATIONS:   1.  Tamiflu 75 mg twice a day for 6 more doses.   2.  Hyzaar 1 tablet daily.   3.  Vitamin D 5000 units daily.      FOLLOWUP APPOINTMENTS: With primary care provider in 1 week.         NASEEM DE LEON MD             D: 2018   T: 2018   MT:       Name:     MIKE FIELD   MRN:      [id number]        Account:        [id number]   :      1940           Admit Date:     02/10/2018                                  Discharge Date: 2018      Document: [id number]       cc: Jay Link MD

## 2023-08-09 RX ORDER — HYDROCHLOROTHIAZIDE 25 MG/1
25 TABLET ORAL DAILY
COMMUNITY
Start: 2023-08-03

## 2023-08-09 RX ORDER — FUROSEMIDE 20 MG
20 TABLET ORAL PRN
COMMUNITY
Start: 2023-08-03

## 2023-08-09 RX ORDER — CEPHALEXIN 500 MG/1
500 CAPSULE ORAL 2 TIMES DAILY
COMMUNITY
Start: 2023-08-09 | End: 2023-08-17

## 2023-08-09 RX ORDER — LOSARTAN POTASSIUM 100 MG/1
100 TABLET ORAL DAILY
COMMUNITY
Start: 2023-08-03

## 2023-08-17 ENCOUNTER — HOSPITAL ENCOUNTER (OUTPATIENT)
Facility: CLINIC | Age: 83
Discharge: HOME | End: 2023-08-17
Attending: INTERNAL MEDICINE | Admitting: INTERNAL MEDICINE
Payer: COMMERCIAL

## 2023-08-17 ENCOUNTER — ANESTHESIA (OUTPATIENT)
Dept: SURGERY | Facility: CLINIC | Age: 83
End: 2023-08-17
Payer: COMMERCIAL

## 2023-08-17 ENCOUNTER — ANESTHESIA EVENT (OUTPATIENT)
Dept: SURGERY | Facility: CLINIC | Age: 83
End: 2023-08-17
Payer: COMMERCIAL

## 2023-08-17 VITALS
HEIGHT: 66 IN | OXYGEN SATURATION: 99 % | WEIGHT: 175.7 LBS | BODY MASS INDEX: 28.24 KG/M2 | TEMPERATURE: 97.5 F | DIASTOLIC BLOOD PRESSURE: 53 MMHG | SYSTOLIC BLOOD PRESSURE: 121 MMHG | HEART RATE: 82 BPM | RESPIRATION RATE: 15 BRPM

## 2023-08-17 LAB — COLONOSCOPY: NORMAL

## 2023-08-17 PROCEDURE — 250N000011 HC RX IP 250 OP 636: Performed by: NURSE ANESTHETIST, CERTIFIED REGISTERED

## 2023-08-17 PROCEDURE — 999N000141 HC STATISTIC PRE-PROCEDURE NURSING ASSESSMENT: Performed by: INTERNAL MEDICINE

## 2023-08-17 PROCEDURE — 88305 TISSUE EXAM BY PATHOLOGIST: CPT | Mod: TC | Performed by: INTERNAL MEDICINE

## 2023-08-17 PROCEDURE — 272N000001 HC OR GENERAL SUPPLY STERILE: Performed by: INTERNAL MEDICINE

## 2023-08-17 PROCEDURE — 710N000012 HC RECOVERY PHASE 2, PER MINUTE: Performed by: INTERNAL MEDICINE

## 2023-08-17 PROCEDURE — 360N000075 HC SURGERY LEVEL 2, PER MIN: Performed by: INTERNAL MEDICINE

## 2023-08-17 PROCEDURE — 370N000017 HC ANESTHESIA TECHNICAL FEE, PER MIN: Performed by: INTERNAL MEDICINE

## 2023-08-17 PROCEDURE — 258N000003 HC RX IP 258 OP 636: Performed by: ANESTHESIOLOGY

## 2023-08-17 RX ORDER — NALOXONE HYDROCHLORIDE 0.4 MG/ML
0.4 INJECTION, SOLUTION INTRAMUSCULAR; INTRAVENOUS; SUBCUTANEOUS
Status: DISCONTINUED | OUTPATIENT
Start: 2023-08-17 | End: 2023-08-17 | Stop reason: HOSPADM

## 2023-08-17 RX ORDER — ONDANSETRON 2 MG/ML
4 INJECTION INTRAMUSCULAR; INTRAVENOUS
Status: DISCONTINUED | OUTPATIENT
Start: 2023-08-17 | End: 2023-08-17 | Stop reason: HOSPADM

## 2023-08-17 RX ORDER — FENTANYL CITRATE 50 UG/ML
50 INJECTION, SOLUTION INTRAMUSCULAR; INTRAVENOUS EVERY 5 MIN PRN
Status: DISCONTINUED | OUTPATIENT
Start: 2023-08-17 | End: 2023-08-17 | Stop reason: HOSPADM

## 2023-08-17 RX ORDER — LIDOCAINE 40 MG/G
CREAM TOPICAL
Status: DISCONTINUED | OUTPATIENT
Start: 2023-08-17 | End: 2023-08-17 | Stop reason: HOSPADM

## 2023-08-17 RX ORDER — HYDRALAZINE HYDROCHLORIDE 20 MG/ML
5-10 INJECTION INTRAMUSCULAR; INTRAVENOUS EVERY 10 MIN PRN
Status: DISCONTINUED | OUTPATIENT
Start: 2023-08-17 | End: 2023-08-17 | Stop reason: HOSPADM

## 2023-08-17 RX ORDER — KETOROLAC TROMETHAMINE 15 MG/ML
15 INJECTION, SOLUTION INTRAMUSCULAR; INTRAVENOUS
Status: DISCONTINUED | OUTPATIENT
Start: 2023-08-17 | End: 2023-08-17 | Stop reason: HOSPADM

## 2023-08-17 RX ORDER — PROCHLORPERAZINE MALEATE 5 MG
5 TABLET ORAL EVERY 6 HOURS PRN
Status: DISCONTINUED | OUTPATIENT
Start: 2023-08-17 | End: 2023-08-17 | Stop reason: HOSPADM

## 2023-08-17 RX ORDER — NALOXONE HYDROCHLORIDE 0.4 MG/ML
0.2 INJECTION, SOLUTION INTRAMUSCULAR; INTRAVENOUS; SUBCUTANEOUS
Status: DISCONTINUED | OUTPATIENT
Start: 2023-08-17 | End: 2023-08-17 | Stop reason: HOSPADM

## 2023-08-17 RX ORDER — LABETALOL HYDROCHLORIDE 5 MG/ML
10 INJECTION, SOLUTION INTRAVENOUS
Status: DISCONTINUED | OUTPATIENT
Start: 2023-08-17 | End: 2023-08-17 | Stop reason: HOSPADM

## 2023-08-17 RX ORDER — ONDANSETRON 4 MG/1
4 TABLET, ORALLY DISINTEGRATING ORAL EVERY 30 MIN PRN
Status: DISCONTINUED | OUTPATIENT
Start: 2023-08-17 | End: 2023-08-17 | Stop reason: HOSPADM

## 2023-08-17 RX ORDER — ALBUTEROL SULFATE 0.83 MG/ML
2.5 SOLUTION RESPIRATORY (INHALATION) EVERY 4 HOURS PRN
Status: DISCONTINUED | OUTPATIENT
Start: 2023-08-17 | End: 2023-08-17 | Stop reason: HOSPADM

## 2023-08-17 RX ORDER — SODIUM CHLORIDE, SODIUM LACTATE, POTASSIUM CHLORIDE, CALCIUM CHLORIDE 600; 310; 30; 20 MG/100ML; MG/100ML; MG/100ML; MG/100ML
INJECTION, SOLUTION INTRAVENOUS CONTINUOUS
Status: DISCONTINUED | OUTPATIENT
Start: 2023-08-17 | End: 2023-08-17 | Stop reason: HOSPADM

## 2023-08-17 RX ORDER — PROPOFOL 10 MG/ML
INJECTION, EMULSION INTRAVENOUS PRN
Status: DISCONTINUED | OUTPATIENT
Start: 2023-08-17 | End: 2023-08-17

## 2023-08-17 RX ORDER — ONDANSETRON 2 MG/ML
4 INJECTION INTRAMUSCULAR; INTRAVENOUS EVERY 30 MIN PRN
Status: DISCONTINUED | OUTPATIENT
Start: 2023-08-17 | End: 2023-08-17 | Stop reason: HOSPADM

## 2023-08-17 RX ORDER — OXYCODONE HYDROCHLORIDE 5 MG/1
10 TABLET ORAL
Status: DISCONTINUED | OUTPATIENT
Start: 2023-08-17 | End: 2023-08-17 | Stop reason: HOSPADM

## 2023-08-17 RX ORDER — DIPHENHYDRAMINE HYDROCHLORIDE 50 MG/ML
12.5 INJECTION INTRAMUSCULAR; INTRAVENOUS EVERY 6 HOURS PRN
Status: DISCONTINUED | OUTPATIENT
Start: 2023-08-17 | End: 2023-08-17 | Stop reason: HOSPADM

## 2023-08-17 RX ORDER — MAGNESIUM SULFATE HEPTAHYDRATE 40 MG/ML
2 INJECTION, SOLUTION INTRAVENOUS
Status: DISCONTINUED | OUTPATIENT
Start: 2023-08-17 | End: 2023-08-17 | Stop reason: HOSPADM

## 2023-08-17 RX ORDER — DIPHENHYDRAMINE HCL 12.5MG/5ML
12.5 LIQUID (ML) ORAL EVERY 6 HOURS PRN
Status: DISCONTINUED | OUTPATIENT
Start: 2023-08-17 | End: 2023-08-17 | Stop reason: HOSPADM

## 2023-08-17 RX ORDER — HYDROMORPHONE HCL IN WATER/PF 6 MG/30 ML
0.4 PATIENT CONTROLLED ANALGESIA SYRINGE INTRAVENOUS EVERY 5 MIN PRN
Status: DISCONTINUED | OUTPATIENT
Start: 2023-08-17 | End: 2023-08-17 | Stop reason: HOSPADM

## 2023-08-17 RX ORDER — FLUMAZENIL 0.1 MG/ML
0.2 INJECTION, SOLUTION INTRAVENOUS
Status: DISCONTINUED | OUTPATIENT
Start: 2023-08-17 | End: 2023-08-17 | Stop reason: HOSPADM

## 2023-08-17 RX ORDER — OXYCODONE HYDROCHLORIDE 5 MG/1
5 TABLET ORAL
Status: DISCONTINUED | OUTPATIENT
Start: 2023-08-17 | End: 2023-08-17 | Stop reason: HOSPADM

## 2023-08-17 RX ORDER — ONDANSETRON 2 MG/ML
4 INJECTION INTRAMUSCULAR; INTRAVENOUS EVERY 6 HOURS PRN
Status: DISCONTINUED | OUTPATIENT
Start: 2023-08-17 | End: 2023-08-17 | Stop reason: HOSPADM

## 2023-08-17 RX ORDER — HYDROMORPHONE HYDROCHLORIDE 1 MG/ML
0.5 INJECTION, SOLUTION INTRAMUSCULAR; INTRAVENOUS; SUBCUTANEOUS EVERY 5 MIN PRN
Status: DISCONTINUED | OUTPATIENT
Start: 2023-08-17 | End: 2023-08-17 | Stop reason: HOSPADM

## 2023-08-17 RX ORDER — PROPOFOL 10 MG/ML
INJECTION, EMULSION INTRAVENOUS CONTINUOUS PRN
Status: DISCONTINUED | OUTPATIENT
Start: 2023-08-17 | End: 2023-08-17

## 2023-08-17 RX ORDER — ONDANSETRON 4 MG/1
4 TABLET, ORALLY DISINTEGRATING ORAL EVERY 6 HOURS PRN
Status: DISCONTINUED | OUTPATIENT
Start: 2023-08-17 | End: 2023-08-17 | Stop reason: HOSPADM

## 2023-08-17 RX ADMIN — PROPOFOL 100 MCG/KG/MIN: 10 INJECTION, EMULSION INTRAVENOUS at 08:40

## 2023-08-17 RX ADMIN — PROPOFOL 40 MG: 10 INJECTION, EMULSION INTRAVENOUS at 08:40

## 2023-08-17 RX ADMIN — SODIUM CHLORIDE, POTASSIUM CHLORIDE, SODIUM LACTATE AND CALCIUM CHLORIDE: 600; 310; 30; 20 INJECTION, SOLUTION INTRAVENOUS at 08:28

## 2023-08-17 ASSESSMENT — ACTIVITIES OF DAILY LIVING (ADL)
ADLS_ACUITY_SCORE: 36
ADLS_ACUITY_SCORE: 33

## 2023-08-17 NOTE — ANESTHESIA CARE TRANSFER NOTE
Patient: Sharron Field    Procedure: Procedure(s):  Colonoscopy with polypectomy       Diagnosis: Screen for colon cancer [Z12.11]  Diagnosis Additional Information: No value filed.    Anesthesia Type:   MAC     Note:    Oropharynx: oropharynx clear of all foreign objects  Level of Consciousness: awake  Oxygen Supplementation: room air    Independent Airway: airway patency satisfactory and stable  Dentition: dentition unchanged  Vital Signs Stable: post-procedure vital signs reviewed and stable  Report to RN Given: handoff report given  Patient transferred to: Phase II    Handoff Report: Identifed the Patient, Identified the Reponsible Provider, Reviewed the pertinent medical history, Discussed the surgical course, Reviewed Intra-OP anesthesia mangement and issues during anesthesia, Set expectations for post-procedure period and Allowed opportunity for questions and acknowledgement of understanding      Vitals:  Vitals Value Taken Time   BP     Temp     Pulse     Resp     SpO2         Electronically Signed By: Dean Dennis Severson, APRN CRNA  August 17, 2023  9:21 AM

## 2023-08-17 NOTE — ANESTHESIA POSTPROCEDURE EVALUATION
Patient: Sharron Field    Procedure: Procedure(s):  Colonoscopy with polypectomy       Anesthesia Type:  MAC    Note:  Disposition: Outpatient   Postop Pain Control: Uneventful            Sign Out: Well controlled pain   PONV: No   Neuro/Psych: Uneventful            Sign Out: Acceptable/Baseline neuro status   Airway/Respiratory: Uneventful            Sign Out: Acceptable/Baseline resp. status   CV/Hemodynamics: Uneventful            Sign Out: Acceptable CV status   Other NRE: NONE   DID A NON-ROUTINE EVENT OCCUR? No           Last vitals:  Vitals Value Taken Time   /53 08/17/23 1030   Temp 97.5  F (36.4  C) 08/17/23 1030   Pulse 82 08/17/23 1030   Resp 15 08/17/23 1030   SpO2 99 % 08/17/23 1032   Vitals shown include unvalidated device data.    Electronically Signed By: Arcadio De Leon MD  August 17, 2023  2:39 PM

## 2023-08-17 NOTE — DISCHARGE INSTRUCTIONS
[facility] ADULT DISCHARGE INSTRUCTIONS   SPECIAL PRECAUTIONS FOR 24 HOURS AFTER SURGERY    IT IS NOT UNUSUAL TO FEEL LIGHT-HEADED OR FAINT, UP TO 24 HOURS AFTER SURGERY OR WHILE TAKING PAIN MEDICATION.  IF YOU HAVE THESE SYMPTOMS; SIT FOR A FEW MINUTES BEFORE STANDING AND HAVE SOMEONE ASSIST YOU WHEN YOU GET UP TO WALK OR USE THE BATHROOM.    YOU SHOULD REST AND RELAX FOR THE NEXT 24 HOURS AND YOU MUST MAKE ARRANGEMENTS TO HAVE SOMEONE STAY WITH YOU FOR AT LEAST 24 HOURS AFTER YOUR DISCHARGE.  AVOID HAZARDOUS AND STRENUOUS ACTIVITIES.  DO NOT MAKE IMPORTANT DECISIONS FOR 24 HOURS.    DO NOT DRIVE ANY VEHICLE OR OPERATE MECHANICAL EQUIPMENT FOR 24 HOURS FOLLOWING THE END OF YOUR SURGERY.  EVEN THOUGH YOU MAY FEEL NORMAL, YOUR REACTIONS MAY BE AFFECTED BY THE MEDICATION YOU HAVE RECEIVED.    DO NOT DRINK ALCOHOLIC BEVERAGES FOR 24 HOURS FOLLOWING YOUR SURGERY.    DRINK CLEAR LIQUIDS (APPLE JUICE, GINGER ALE, 7-UP, BROTH, ETC.).  PROGRESS TO YOUR REGULAR DIET AS YOU FEEL ABLE.    YOU MAY HAVE A DRY MOUTH, A SORE THROAT, MUSCLES ACHES OR TROUBLE SLEEPING.  THESE SHOULD GO AWAY AFTER 24 HOURS.    CALL YOUR DOCTOR FOR ANY OF THE FOLLOWING:  SIGNS OF INFECTION (FEVER, GROWING TENDERNESS AT THE SURGERY SITE, A LARGE AMOUNT OF DRAINAGE OR BLEEDING, SEVERE PAIN, FOUL-SMELLING DRAINAGE, REDNESS OR SWELLING.    IT HAS BEEN OVER 8 TO 10 HOURS SINCE SURGERY AND YOU ARE STILL NOT ABLE TO URINATE (PASS WATER).      Maximum acetaminophen (Tylenol) dose from all sources should not exceed 4 grams (4000 mg) per day.

## 2023-08-17 NOTE — ANESTHESIA PREPROCEDURE EVALUATION
Anesthesia Pre-Procedure Evaluation    Patient: Sharron Field   MRN: [id number] : 1940        Procedure : Procedure(s):  Colonoscopy          Past Medical History:   Diagnosis Date    Arthritis     Hypertension     PONV (postoperative nausea and vomiting)       Past Surgical History:   Procedure Laterality Date    CAUTERIZE INNER NOSE      COLONOSCOPY      ENT SURGERY      Tonsillectomy & adenoidectomy    GYN SURGERY      ORTHOPEDIC SURGERY      hammer toe surgery    PARTIAL HYSTERECTOMY      PLACEMENT OF DENTAL IMPLANT(S)  2017      Allergies   Allergen Reactions    Lisinopril Cough     Dry cough    Sulfa Antibiotics       Social History     Tobacco Use    Smoking status: Former     Types: Cigarettes     Quit date:      Years since quittin.6    Smokeless tobacco: Never   Substance Use Topics    Alcohol use: Yes     Comment: ocassional      Wt Readings from Last 1 Encounters:   23 79.7 kg (175 lb 11.2 oz)        Anesthesia Evaluation   Pt has had prior anesthetic.     History of anesthetic complications  - PONV.      ROS/MED HX  ENT/Pulmonary:  - neg pulmonary ROS     Neurologic:  - neg neurologic ROS     Cardiovascular:     (+)  hypertension- -   -  - -                                      METS/Exercise Tolerance:     Hematologic:       Musculoskeletal:   (+)  arthritis,             GI/Hepatic:     (+)        bowel prep,            Renal/Genitourinary:  - neg Renal ROS     Endo:  - neg endo ROS     Psychiatric/Substance Use:  - neg psychiatric ROS     Infectious Disease:  - neg infectious disease ROS     Malignancy:   (+) Malignancy, History of GI.    Other:            Physical Exam    Airway        Mallampati: II   TM distance: > 3 FB   Neck ROM: full   Mouth opening: > 3 cm    Respiratory Devices and Support         Dental       (+) Modest Abnormalities - crowns, retainers, 1 or 2 missing teeth      Cardiovascular          Rhythm and rate: regular and normal     Pulmonary           breath  sounds clear to auscultation           OUTSIDE LABS:  CBC:   Lab Results   Component Value Date    WBC 8.4 02/10/2018    HGB 13.4 02/10/2018    HGB 13.9 08/28/2008    HCT 39.2 02/10/2018     02/10/2018     BMP:   Lab Results   Component Value Date     (L) 02/10/2018    POTASSIUM 3.6 02/10/2018    CHLORIDE 93 (L) 02/10/2018    CO2 28 02/10/2018    BUN 13 02/10/2018    CR 0.66 02/10/2018     (H) 02/10/2018     COAGS: No results found for: PTT, INR, FIBR  POC: No results found for: BGM, HCG, HCGS  HEPATIC: No results found for: ALBUMIN, PROTTOTAL, ALT, AST, GGT, ALKPHOS, BILITOTAL, BILIDIRECT, JULISA  OTHER:   Lab Results   Component Value Date    JHONNY 9.0 02/10/2018       Anesthesia Plan    ASA Status:  2    NPO Status:  NPO Appropriate    Anesthesia Type: MAC.     - Reason for MAC: straight local not clinically adequate, immobility needed, chronic cardiopulmonary disease   Induction: Intravenous.   Maintenance: TIVA.        Consents    Anesthesia Plan(s) and associated risks, benefits, and realistic alternatives discussed. Questions answered and patient/representative(s) expressed understanding.     - Discussed:     - Discussed with:  Patient            Postoperative Care    Pain management: IV analgesics.   PONV prophylaxis: Ondansetron (or other 5HT-3), Dexamethasone or Solumedrol     Comments:                Arcadio De Leon MD

## 2023-08-18 LAB
PATH REPORT.COMMENTS IMP SPEC: NORMAL
PATH REPORT.COMMENTS IMP SPEC: NORMAL
PATH REPORT.FINAL DX SPEC: NORMAL
PATH REPORT.GROSS SPEC: NORMAL
PATH REPORT.MICROSCOPIC SPEC OTHER STN: NORMAL
PATH REPORT.RELEVANT HX SPEC: NORMAL
PHOTO IMAGE: NORMAL

## 2023-08-18 PROCEDURE — 88305 TISSUE EXAM BY PATHOLOGIST: CPT | Mod: 26

## (undated) DEVICE — SOL WATER IRRIG 1000ML BOTTLE 2F7114

## (undated) DEVICE — ENDO SNARE POLYPECTOMY OVAL 15MM LOOP SD-240U-15

## (undated) DEVICE — BAG CLEAR TRASH 1.3M 39X33" P4040C

## (undated) DEVICE — SUCTION MANIFOLD NEPTUNE 2 SYS 4 PORT 0702-020-000

## (undated) DEVICE — KIT PROCEDURE W/CLEAN-A-SCOPE LINERS V2 200800

## (undated) DEVICE — TUBING SUCTION MEDI-VAC SOFT 3/16"X20' N520A

## (undated) DEVICE — PAD CHUX UNDERPAD 30X36" P3036C

## (undated) RX ORDER — PROPOFOL 10 MG/ML
INJECTION, EMULSION INTRAVENOUS
Status: DISPENSED
Start: 2023-08-17